# Patient Record
Sex: FEMALE | Race: AMERICAN INDIAN OR ALASKA NATIVE
[De-identification: names, ages, dates, MRNs, and addresses within clinical notes are randomized per-mention and may not be internally consistent; named-entity substitution may affect disease eponyms.]

---

## 2020-02-27 ENCOUNTER — HOSPITAL ENCOUNTER (EMERGENCY)
Dept: HOSPITAL 5 - ED | Age: 20
Discharge: HOME | End: 2020-02-27
Payer: COMMERCIAL

## 2020-02-27 VITALS — DIASTOLIC BLOOD PRESSURE: 76 MMHG | SYSTOLIC BLOOD PRESSURE: 121 MMHG

## 2020-02-27 DIAGNOSIS — O23.41: Primary | ICD-10-CM

## 2020-02-27 DIAGNOSIS — Z3A.12: ICD-10-CM

## 2020-02-27 LAB
BASOPHILS # (AUTO): 0.1 K/MM3 (ref 0–0.1)
BASOPHILS NFR BLD AUTO: 0.8 % (ref 0–1.8)
BILIRUB UR QL STRIP: (no result)
BLOOD UR QL VISUAL: (no result)
BUN SERPL-MCNC: 6 MG/DL (ref 7–17)
BUN/CREAT SERPL: 10 %
CALCIUM SERPL-MCNC: 9.4 MG/DL (ref 8.4–10.2)
EOSINOPHIL # BLD AUTO: 0 K/MM3 (ref 0–0.4)
EOSINOPHIL NFR BLD AUTO: 0.5 % (ref 0–4.3)
HCT VFR BLD CALC: 37.8 % (ref 30.3–42.9)
HEMOLYSIS INDEX: 16
HGB BLD-MCNC: 13.2 GM/DL (ref 10.1–14.3)
LYMPHOCYTES # BLD AUTO: 1.9 K/MM3 (ref 1.2–5.4)
LYMPHOCYTES NFR BLD AUTO: 21.9 % (ref 13.4–35)
MCHC RBC AUTO-ENTMCNC: 35 % (ref 30–34)
MCV RBC AUTO: 89 FL (ref 79–97)
MONOCYTES # (AUTO): 0.6 K/MM3 (ref 0–0.8)
MONOCYTES % (AUTO): 7.5 % (ref 0–7.3)
MUCOUS THREADS #/AREA URNS HPF: (no result) /HPF
PH UR STRIP: 6 [PH] (ref 5–7)
PLATELET # BLD: 250 K/MM3 (ref 140–440)
PROT UR STRIP-MCNC: (no result) MG/DL
RBC # BLD AUTO: 4.27 M/MM3 (ref 3.65–5.03)
RBC #/AREA URNS HPF: 6 /HPF (ref 0–6)
UROBILINOGEN UR-MCNC: < 2 MG/DL (ref ?–2)
WBC #/AREA URNS HPF: 16 /HPF (ref 0–6)

## 2020-02-27 PROCEDURE — 99284 EMERGENCY DEPT VISIT MOD MDM: CPT

## 2020-02-27 PROCEDURE — 84702 CHORIONIC GONADOTROPIN TEST: CPT

## 2020-02-27 PROCEDURE — 85025 COMPLETE CBC W/AUTO DIFF WBC: CPT

## 2020-02-27 PROCEDURE — 80048 BASIC METABOLIC PNL TOTAL CA: CPT

## 2020-02-27 PROCEDURE — 86900 BLOOD TYPING SEROLOGIC ABO: CPT

## 2020-02-27 PROCEDURE — 36415 COLL VENOUS BLD VENIPUNCTURE: CPT

## 2020-02-27 PROCEDURE — 87076 CULTURE ANAEROBE IDENT EACH: CPT

## 2020-02-27 PROCEDURE — 87086 URINE CULTURE/COLONY COUNT: CPT

## 2020-02-27 PROCEDURE — 86901 BLOOD TYPING SEROLOGIC RH(D): CPT

## 2020-02-27 PROCEDURE — 87186 SC STD MICRODIL/AGAR DIL: CPT

## 2020-02-27 PROCEDURE — 76801 OB US < 14 WKS SINGLE FETUS: CPT

## 2020-02-27 PROCEDURE — 81001 URINALYSIS AUTO W/SCOPE: CPT

## 2020-02-27 PROCEDURE — 96372 THER/PROPH/DIAG INJ SC/IM: CPT

## 2020-02-27 NOTE — EMERGENCY DEPARTMENT REPORT
ED Abdominal Pain HPI





- General


Chief Complaint: Abdominal Pain


Stated Complaint: 12 WKS PREGNANT/ABD PAIN


Time Seen by Provider: 02/27/20 16:54


Source: patient


Mode of arrival: Ambulatory


Limitations: No Limitations





- History of Present Illness


Initial Comments: 





Patient is a 19-year-old -American female who comes into the ER for 

abdominal pain during pregnancy.  She she reported in triage that her OB sent 

her to the emergency room for an ultrasound.  However, she told me she does not 

have an OB.  Patient has suprapubic pain.  Patient denies vaginal bleeding or 

discharge.  She denies dysuria.  No fever or chills.  This is her first pregnanc

y.


MD Complaint: abdominal pain


Severity scale (0 -10): 7





- Related Data


                                  Previous Rx's











 Medication  Instructions  Recorded  Last Taken  Type


 


Nitrofurantoin Mono/M-Cryst 100 mg PO Q12HR #10 capsule 02/27/20 Unknown Rx





[Macrobid CAP]    











                                    Allergies











Allergy/AdvReac Type Severity Reaction Status Date / Time


 


No Known Allergies Allergy   Unverified 02/27/20 16:31














ED Review of Systems


ROS: 


Stated complaint: 12 WKS PREGNANT/ABD PAIN


Other details as noted in HPI





Comment: All other systems reviewed and negative





ED Past Medical Hx





- Past Medical History


Previous Medical History?: No





- Surgical History


Past Surgical History?: Yes


Additional Surgical History: ARM





- Family History


Family history: no significant





- Social History


Smoking Status: Never Smoker


Substance Use Type: None





- Medications


Home Medications: 


                                Home Medications











 Medication  Instructions  Recorded  Confirmed  Last Taken  Type


 


Nitrofurantoin Mono/M-Cryst 100 mg PO Q12HR #10 capsule 02/27/20  Unknown Rx





[Macrobid CAP]     














ED Physical Exam





- General


Limitations: No Limitations


General appearance: alert, in no apparent distress





- Head


Head exam: Present: atraumatic, normocephalic





- Eye


Eye exam: Present: normal appearance





- ENT


ENT exam: Present: mucous membranes moist





- Neck


Neck exam: Present: normal inspection





- Respiratory


Respiratory exam: Present: normal lung sounds bilaterally.  Absent: respiratory 

distress





- Cardiovascular


Cardiovascular Exam: Present: regular rate, normal rhythm.  Absent: systolic 

murmur, diastolic murmur, rubs, gallop





- GI/Abdominal


GI/Abdominal exam: Present: soft, normal bowel sounds





- Extremities Exam


Extremities exam: Present: normal inspection





- Back Exam


Back exam: Present: normal inspection





- Neurological Exam


Neurological exam: Present: alert, oriented X3





- Psychiatric


Psychiatric exam: Present: normal affect, normal mood





- Skin


Skin exam: Present: warm, dry, intact, normal color.  Absent: rash





ED Course


                                   Vital Signs











  02/27/20





  16:38


 


Temperature 98.3 F


 


Pulse Rate 76


 


Respiratory 18





Rate 


 


Blood Pressure 121/76


 


Blood Pressure 121/76





[Right] 


 


O2 Sat by Pulse 100





Oximetry 














ED Medical Decision Making





- Lab Data


Result diagrams: 


                                 02/27/20 17:07





                                 02/27/20 17:07





- Radiology Data


Radiology results: report reviewed





- Medical Decision Making








Labs











  02/27/20 02/27/20 02/27/20





  17:07 17:07 17:07


 


WBC  8.5  


 


RBC  4.27  


 


Hgb  13.2  


 


Hct  37.8  


 


MCV  89  


 


MCH  31  


 


MCHC  35 H  


 


RDW  14.5  


 


Plt Count  250  


 


Lymph % (Auto)  21.9  


 


Mono % (Auto)  7.5 H  


 


Eos % (Auto)  0.5  


 


Baso % (Auto)  0.8  


 


Lymph #  1.9  


 


Mono #  0.6  


 


Eos #  0.0  


 


Baso #  0.1  


 


Seg Neutrophils %  69.3  


 


Seg Neutrophils #  5.9  


 


Sodium    138


 


Potassium    4.0


 


Chloride    100.3


 


Carbon Dioxide    24


 


Anion Gap    18


 


BUN    6 L


 


Creatinine    0.6 L


 


Estimated GFR    > 60


 


BUN/Creatinine Ratio    10


 


Glucose    87


 


Calcium    9.4


 


HCG, Quant   05469 H 


 


Blood Type   


 


Ord Rhogam Gestat Weeks   














  02/27/20





  17:07


 


WBC 


 


RBC 


 


Hgb 


 


Hct 


 


MCV 


 


MCH 


 


MCHC 


 


RDW 


 


Plt Count 


 


Lymph % (Auto) 


 


Mono % (Auto) 


 


Eos % (Auto) 


 


Baso % (Auto) 


 


Lymph # 


 


Mono # 


 


Eos # 


 


Baso # 


 


Seg Neutrophils % 


 


Seg Neutrophils # 


 


Sodium 


 


Potassium 


 


Chloride 


 


Carbon Dioxide 


 


Anion Gap 


 


BUN 


 


Creatinine 


 


Estimated GFR 


 


BUN/Creatinine Ratio 


 


Glucose 


 


Calcium 


 


HCG, Quant 


 


Blood Type  O POSITIVE


 


Ord Rhogam Gestat Weeks  Rh pos











                               Vital Signs - 24 hr











  02/27/20





  16:38


 


Temperature 98.3 F


 


Pulse Rate 76


 


Respiratory 18





Rate 


 


Blood Pressure 121/76


 


Blood Pressure 121/76





[Right] 


 


O2 Sat by Pulse 100





Oximetry 








Labs noted.





UA noted.





Ultrasound noted.





Rh+





Patient given Rocephin and normal saline in the ER.





Patient being discharged to home with Macrobid twice daily.  She needs to see an

 OB/GYN within 48 hours.  Patient verbalizes understanding.





- Differential Diagnosis


Rule out ectopic, rule out miscarriage, rule out UTI


Critical care attestation.: 


If time is entered above; I have spent that time in minutes in the direct care 

of this critically ill patient, excluding procedure time.








ED Disposition


Clinical Impression: 


 Pregnancy, UTI (urinary tract infection)





Disposition: DC-01 TO HOME OR SELFCARE


Is pt being admited?: No


Does the pt Need Aspirin: No


Condition: Stable


Instructions:  Pregnancy (ED)


Additional Instructions: 


TYLENOL FOR PAIN





PELVIC REST





AVOID ALCOHOL





FOLLOW UP WITH OBGYN IN 48 HOURS FOR RECHECK


REFERRAL BELOW





BLOOD TYPE RH POS





HCG 46401





US MEASURES 10W4D





MACROBID AS ORDERED


Referrals: 


MAT DEVI MD [Staff Physician] - 3-5 Days


Time of Disposition: 18:11

## 2020-02-27 NOTE — EVENT NOTE
ED Screening Note


Date of service: 19


Time: 16:37


ED Screening Note: 


19 y o   presents to ED at  10 weeks gestation  complaining of abd pain


pelvic pain , pressure pain


denies vag bleed, dysuria








ob: [facility]


called [facility], was told to come to ED





This initial assessment/diagnostic orders/clinical plan/treatment(s) is/are 

subject to change based on patients health status, clinical progression and re-

assessment by fellow clinical providers in the ED. Further treatment and workup 

at subsequent clinical providers discretion. Patient/guardian urged not to elope

from the ED as their condition may be serious if not clinically assessed and 

managed. 





Initial orders include: 


labs, ua, 


US


IVF

## 2020-02-27 NOTE — ULTRASOUND REPORT
ULTRASOUND OBSTETRIC 



INDICATION / CLINICAL INFORMATION:

pelv pain.



TECHNIQUE: Transvaginal imaging was performed.



COMPARISON:

None available.



FINDINGS:

GESTATIONAL SAC: Well-defined oval shape and intrauterine in location. 

YOLK SAC: No significant abnormality.



EMBRYO/FETUS: No significant abnormality. 

- Crown-Rump Length = 3.1 cm = 10 weeks, 0 day(s).

- Fetal Heart Rate, beats per minute (if present) = 160



ADNEXA: No significant abnormality.

FREE FLUID: None.



ADDITIONAL FINDINGS: Small subchorionic hemorrhage is noted



IMPRESSION:

1. Single, living intrauterine pregnancy with estimated sonographic age of 10 weeks, 0 day(s).



Signer Name: Mark Montejo MD 

Signed: 2/27/2020 6:38 PM

 Workstation Name: [id number]